# Patient Record
Sex: MALE | Race: OTHER | URBAN - METROPOLITAN AREA
[De-identification: names, ages, dates, MRNs, and addresses within clinical notes are randomized per-mention and may not be internally consistent; named-entity substitution may affect disease eponyms.]

---

## 2023-06-22 ENCOUNTER — OFFICE VISIT (OUTPATIENT)
Dept: URBAN - METROPOLITAN AREA CLINIC 82 | Facility: CLINIC | Age: 58
End: 2023-06-22
Payer: OTHER GOVERNMENT

## 2023-06-22 DIAGNOSIS — H52.4 PRESBYOPIA: ICD-10-CM

## 2023-06-22 DIAGNOSIS — E11.9 TYPE 2 DIABETES MELLITUS W/O COMPLICATION: ICD-10-CM

## 2023-06-22 DIAGNOSIS — H25.13 AGE-RELATED NUCLEAR CATARACT, BILATERAL: Primary | ICD-10-CM

## 2023-06-22 DIAGNOSIS — Z13.5 ENCOUNTER FOR SCREENING FOR EYE AND EAR DISORDERS: ICD-10-CM

## 2023-06-22 PROCEDURE — 99204 OFFICE O/P NEW MOD 45 MIN: CPT | Performed by: OPTOMETRIST

## 2023-06-22 PROCEDURE — 92082 INTERMEDIATE VISUAL FIELD XM: CPT | Performed by: OPTOMETRIST

## 2023-06-22 ASSESSMENT — KERATOMETRY
OD: 41.38
OS: 41.25

## 2023-06-22 ASSESSMENT — INTRAOCULAR PRESSURE
OS: 18
OD: 21

## 2023-06-22 ASSESSMENT — VISUAL ACUITY
OD: 20/30
OS: 20/30

## 2023-06-22 NOTE — IMPRESSION/PLAN
Impression: Type 2 diabetes mellitus w/o complication: Y27.9. Plan: Diabetes type II: no background retinopathy. Discussed ocular and systemic benefits of blood sugar and blood pressure control. Call the office if any changes in vision status or ocular symptoms occur.

## 2023-06-22 NOTE — IMPRESSION/PLAN
Impression: Encounter for screening for eye and ear disorders: Z13.5. Plan: Patient elected [employer] screening OU. Dr Kaylah Sood discussed findings with patient today.